# Patient Record
Sex: FEMALE | Race: WHITE | Employment: FULL TIME | URBAN - METROPOLITAN AREA
[De-identification: names, ages, dates, MRNs, and addresses within clinical notes are randomized per-mention and may not be internally consistent; named-entity substitution may affect disease eponyms.]

---

## 2017-08-17 ENCOUNTER — APPOINTMENT (OUTPATIENT)
Dept: GENERAL RADIOLOGY | Facility: HOSPITAL | Age: 27
End: 2017-08-17

## 2017-08-17 ENCOUNTER — HOSPITAL ENCOUNTER (EMERGENCY)
Facility: HOSPITAL | Age: 27
Discharge: HOME | End: 2017-08-17
Attending: EMERGENCY MEDICINE

## 2017-08-17 VITALS
RESPIRATION RATE: 16 BRPM | HEIGHT: 60 IN | SYSTOLIC BLOOD PRESSURE: 128 MMHG | BODY MASS INDEX: 32.39 KG/M2 | OXYGEN SATURATION: 100 % | TEMPERATURE: 98 F | WEIGHT: 165 LBS | HEART RATE: 80 BPM | DIASTOLIC BLOOD PRESSURE: 93 MMHG

## 2017-08-17 DIAGNOSIS — M77.8 TENDONITIS OF WRIST, RIGHT: Primary | ICD-10-CM

## 2017-08-17 PROCEDURE — 99282 EMERGENCY DEPT VISIT SF MDM: CPT

## 2017-08-17 RX ORDER — IBUPROFEN 600 MG/1
600 TABLET ORAL EVERY 6 HOURS PRN
COMMUNITY

## 2017-08-17 RX ORDER — NAPROXEN 500 MG/1
500 TABLET ORAL 2 TIMES DAILY PRN
Qty: 20 TABLET | Refills: 0 | Status: SHIPPED | OUTPATIENT
Start: 2017-08-17 | End: 2017-09-16

## 2017-08-17 RX ORDER — NAPROXEN 500 MG/1
500 TABLET ORAL 2 TIMES DAILY PRN
Qty: 20 TABLET | Refills: 0 | Status: SHIPPED | OUTPATIENT
Start: 2017-08-17 | End: 2017-08-24

## 2017-08-17 RX ORDER — NAPROXEN 250 MG/1
500 TABLET ORAL ONCE
Status: COMPLETED | OUTPATIENT
Start: 2017-08-17 | End: 2017-08-17

## 2017-08-17 NOTE — ED PROVIDER NOTES
Patient Seen in: [facility] Emergency Department    History   Patient presents with:  Upper Extremity Injury (musculoskeletal)    Stated Complaint: wrist pain    HPI    This is a 51-year-old female complaining of right wrist pain and swelling for th °C)  Temp src: Temporal  SpO2: 100 %  O2 Device: None (Room air)    Current:/93   Pulse 80   Temp 98 °F (36.7 °C) (Temporal)   Resp 16   Ht 152.4 cm (5')   Wt 74.8 kg   LMP 06/18/2017 (Approximate)   SpO2 100%   Breastfeeding?  No   BMI 32.22 kg/m² medications    naproxen 500 MG Oral Tab  Take 1 tablet (500 mg total) by mouth 2 (two) times daily as needed.   Qty: 20 tablet Refills: 0

## 2018-09-29 ENCOUNTER — HOSPITAL ENCOUNTER (OUTPATIENT)
Age: 28
Discharge: HOME | End: 2018-09-29
Attending: EMERGENCY MEDICINE
Payer: MEDICAID

## 2018-09-29 VITALS
HEIGHT: 60 IN | BODY MASS INDEX: 31.41 KG/M2 | WEIGHT: 160 LBS | SYSTOLIC BLOOD PRESSURE: 152 MMHG | OXYGEN SATURATION: 98 % | RESPIRATION RATE: 18 BRPM | HEART RATE: 91 BPM | DIASTOLIC BLOOD PRESSURE: 100 MMHG | TEMPERATURE: 99 F

## 2018-09-29 DIAGNOSIS — Z20.2 POSSIBLE EXPOSURE TO STD: Primary | ICD-10-CM

## 2018-09-29 PROCEDURE — 99214 OFFICE O/P EST MOD 30 MIN: CPT

## 2018-09-29 PROCEDURE — 87491 CHLMYD TRACH DNA AMP PROBE: CPT | Performed by: EMERGENCY MEDICINE

## 2018-09-29 PROCEDURE — 81025 URINE PREGNANCY TEST: CPT

## 2018-09-29 PROCEDURE — 87591 N.GONORRHOEAE DNA AMP PROB: CPT | Performed by: EMERGENCY MEDICINE

## 2018-09-29 PROCEDURE — 87510 GARDNER VAG DNA DIR PROBE: CPT | Performed by: EMERGENCY MEDICINE

## 2018-09-29 PROCEDURE — 87480 CANDIDA DNA DIR PROBE: CPT | Performed by: EMERGENCY MEDICINE

## 2018-09-29 PROCEDURE — 81002 URINALYSIS NONAUTO W/O SCOPE: CPT

## 2018-09-29 PROCEDURE — 87660 TRICHOMONAS VAGIN DIR PROBE: CPT | Performed by: EMERGENCY MEDICINE

## 2018-09-29 NOTE — ED PROVIDER NOTES
Patient Seen in: [facility]    History   Patient presents with:  Eval-G (gynecologic)    Stated Complaint: EVAL G    HPI    54-year-old female presents to the [address] [place] [address] with concern of possible STD exposure.   Patient states that tenderness. Genitourinary: Pelvic exam was performed with patient supine. Cervix exhibits no motion tenderness. Right adnexum displays no tenderness. Left adnexum displays no tenderness. No bleeding in the vagina. Vaginal discharge found.    Neurological:

## 2024-08-02 ENCOUNTER — APPOINTMENT (OUTPATIENT)
Dept: PEDIATRICS | Age: 34
End: 2024-08-02

## (undated) NOTE — LETTER
August 17, 2017    Patient: Maia Hogan   Date of Visit: 8/17/2017       To Whom It May Concern:    Pino Acosta was seen and treated in our emergency department on 8/17/2017. She should not return to work until recheck with orthopedics.

## (undated) NOTE — ED AVS SNAPSHOT
Pauletteedd Mcgee   MRN: [id number]    Department:  [facility] Emergency Department   Date of Visit:  8/17/2017           Disclosure     Insurance plans vary and the physician(s) referred by the ER may not be covered by your plan.  Please contac If you have been prescribed any medication(s), please fill your prescription right away and begin taking the medication(s) as directed    If the emergency physician has read X-rays, these will be re-interpreted by a radiologist.  If there is a significant